# Patient Record
Sex: FEMALE | Race: WHITE | NOT HISPANIC OR LATINO | URBAN - METROPOLITAN AREA
[De-identification: names, ages, dates, MRNs, and addresses within clinical notes are randomized per-mention and may not be internally consistent; named-entity substitution may affect disease eponyms.]

---

## 2018-08-11 ENCOUNTER — OFFICE VISIT (OUTPATIENT)
Dept: URGENT CARE | Facility: MEDICAL CENTER | Age: 53
End: 2018-08-11
Payer: COMMERCIAL

## 2018-08-11 VITALS
BODY MASS INDEX: 42.27 KG/M2 | HEART RATE: 107 BPM | HEIGHT: 64 IN | TEMPERATURE: 97.9 F | DIASTOLIC BLOOD PRESSURE: 88 MMHG | WEIGHT: 247.6 LBS | SYSTOLIC BLOOD PRESSURE: 142 MMHG | RESPIRATION RATE: 20 BRPM | OXYGEN SATURATION: 100 %

## 2018-08-11 DIAGNOSIS — J20.9 ACUTE BRONCHITIS, UNSPECIFIED ORGANISM: Primary | ICD-10-CM

## 2018-08-11 DIAGNOSIS — R11.0 NAUSEA: ICD-10-CM

## 2018-08-11 PROCEDURE — 99213 OFFICE O/P EST LOW 20 MIN: CPT | Performed by: PHYSICIAN ASSISTANT

## 2018-08-11 RX ORDER — ONDANSETRON 4 MG/1
4 TABLET, ORALLY DISINTEGRATING ORAL ONCE
Status: COMPLETED | OUTPATIENT
Start: 2018-08-11 | End: 2018-08-11

## 2018-08-11 RX ORDER — AZITHROMYCIN 250 MG/1
TABLET, FILM COATED ORAL
Qty: 6 TABLET | Refills: 0 | Status: SHIPPED | OUTPATIENT
Start: 2018-08-11 | End: 2018-08-15

## 2018-08-11 RX ORDER — GLIMEPIRIDE 2 MG/1
2 TABLET ORAL
COMMUNITY
Start: 2018-08-08

## 2018-08-11 RX ORDER — ALLOPURINOL 100 MG/1
100 TABLET ORAL
COMMUNITY
Start: 2018-03-23

## 2018-08-11 RX ORDER — MULTIVIT-MIN/IRON/FOLIC ACID/K 18-600-40
CAPSULE ORAL
COMMUNITY

## 2018-08-11 RX ORDER — GLIMEPIRIDE 4 MG/1
4 TABLET ORAL
COMMUNITY
Start: 2018-08-08

## 2018-08-11 RX ORDER — LORATADINE 10 MG/1
10 TABLET ORAL
COMMUNITY
Start: 2017-09-21 | End: 2018-09-21

## 2018-08-11 RX ORDER — DEXTROMETHORPHAN HYDROBROMIDE AND PROMETHAZINE HYDROCHLORIDE 15; 6.25 MG/5ML; MG/5ML
5 SYRUP ORAL 4 TIMES DAILY PRN
Qty: 118 ML | Refills: 0 | Status: SHIPPED | OUTPATIENT
Start: 2018-08-11

## 2018-08-11 RX ORDER — DICYCLOMINE HYDROCHLORIDE 10 MG/1
10 CAPSULE ORAL 3 TIMES DAILY
COMMUNITY
Start: 2018-08-09

## 2018-08-11 RX ORDER — LISINOPRIL 5 MG/1
5 TABLET ORAL
COMMUNITY
Start: 2018-07-05

## 2018-08-11 RX ADMIN — ONDANSETRON 4 MG: 4 TABLET, ORALLY DISINTEGRATING ORAL at 11:14

## 2018-08-11 NOTE — PROGRESS NOTES
[facility]        NAME: Hoa Ga is a 46 y o  female  : 1965    MRN: [id number]  DATE: 2018  TIME: 11:38 AM    Assessment and Plan   Acute bronchitis, unspecified organism [J20 9]  1  Acute bronchitis, unspecified organism  azithromycin (ZITHROMAX) 250 mg tablet    promethazine-dextromethorphan (PHENERGAN-DM) 6 25-15 mg/5 mL oral syrup   2  Nausea  ondansetron (ZOFRAN-ODT) dispersible tablet 4 mg         Patient Instructions     Zofran given in office  Take azithromycin as directed  Take with food and eat yogurt or a probiotic to decrease GI upset  Use promethazine-DM as needed for cough  Follow up with PCP in 3-5 days  Proceed to  ER if symptoms worsen  Chief Complaint     Chief Complaint   Patient presents with    Cough         History of Present Illness       This is a 46year old female presenting for URI symptoms x 2 days  She reports cough productive of white sputum, sneezing, sinus congestion, sore throat, crampy abdominal pain with diarrhea (patient with PMH of IBS, she is unsure if this is elevated from baseline), and nausea  She denies any fevers  + shortness of breath  She has not tried anything for her symptoms  She states that she has not been diagnosed with COPD but that she gets frequent bronchitis  Upon chart review, previous diagnosis of chronic bronchitis is noted  She states that she has taken prednisone before but she is a diabetic and has high blood pressure and her blood sugars "went way up" with prednisone treatment  Review of Systems   Review of Systems   Constitutional: Positive for chills and fatigue  Negative for fever  HENT: Positive for congestion, ear pain, rhinorrhea and sore throat  Negative for ear discharge and facial swelling  Respiratory: Positive for cough and shortness of breath  Negative for wheezing  Gastrointestinal: Positive for abdominal pain (crampy), diarrhea and nausea  Negative for vomiting     Musculoskeletal: Positive for myalgias  Skin: Negative for rash  Neurological: Negative for dizziness, weakness and headaches  Current Medications       Current Outpatient Prescriptions:     allopurinol (ZYLOPRIM) 100 mg tablet, Take 100 mg by mouth, Disp: , Rfl:     dicyclomine (BENTYL) 10 mg capsule, Take 10 mg by mouth Three times a day, Disp: , Rfl:     glimepiride (AMARYL) 2 mg tablet, Take 2 mg by mouth, Disp: , Rfl:     glimepiride (AMARYL) 4 mg tablet, Take 4 mg by mouth, Disp: , Rfl:     glucose blood test strip, Use to test twice daily, Disp: , Rfl:     lisinopril (ZESTRIL) 5 mg tablet, Take 5 mg by mouth, Disp: , Rfl:     loratadine (CLARITIN) 10 mg tablet, Take 10 mg by mouth, Disp: , Rfl:     metFORMIN (GLUCOPHAGE) 1000 MG tablet, Take 1,000 mg by mouth, Disp: , Rfl:     azithromycin (ZITHROMAX) 250 mg tablet, Take 2 tablets today then 1 tablet daily x 4 days, Disp: 6 tablet, Rfl: 0    Cholecalciferol (VITAMIN D) 2000 units CAPS, Take by mouth, Disp: , Rfl:     promethazine-dextromethorphan (PHENERGAN-DM) 6 25-15 mg/5 mL oral syrup, Take 5 mL by mouth 4 (four) times a day as needed for cough, Disp: 118 mL, Rfl: 0  No current facility-administered medications for this visit  Current Allergies     Allergies as of 08/11/2018 - Reviewed 08/11/2018   Allergen Reaction Noted    Penicillins  08/11/2018            The following portions of the patient's history were reviewed and updated as appropriate: allergies, current medications, past family history, past medical history, past social history, past surgical history and problem list      No past medical history on file  No past surgical history on file  No family history on file  Medications have been verified          Objective   /88 (BP Location: Left arm, Patient Position: Sitting, Cuff Size: Standard)   Pulse (!) 107   Temp 97 9 °F (36 6 °C) (Oral)   Resp 20   Ht 5' 4" (1 626 m)   Wt 112 kg (247 lb 9 6 oz)   SpO2 100% Breastfeeding? No   BMI 42 50 kg/m²        Physical Exam     Physical Exam   Constitutional: She appears well-developed and well-nourished  No distress  HENT:   Head: Normocephalic and atraumatic  Right Ear: Tympanic membrane, external ear and ear canal normal  No drainage or tenderness  Left Ear: Tympanic membrane, external ear and ear canal normal  No drainage or tenderness  Nose: Nose normal    Mouth/Throat: Uvula is midline, oropharynx is clear and moist and mucous membranes are normal  No oropharyngeal exudate, posterior oropharyngeal edema or posterior oropharyngeal erythema  Eyes: Conjunctivae are normal  Pupils are equal, round, and reactive to light  Neck: Normal range of motion  Neck supple  Cardiovascular: Normal rate, regular rhythm and normal heart sounds  Pulmonary/Chest: Effort normal and breath sounds normal  No respiratory distress  She has no decreased breath sounds  She has no wheezes  She has no rhonchi  She has no rales  Patient coughing frequently and appears out of breath with coughing  SpO2 remains 100% throughout visit  Abdominal: Soft  Bowel sounds are normal  She exhibits no distension and no mass  There is tenderness (TTP throughout the abdomen  Negative mcburneys, negative murphys, negative CVA ttp b/l )  There is no rebound and no guarding  Lymphadenopathy:     She has no cervical adenopathy  Neurological: She is alert  Skin: Skin is warm and dry  She is not diaphoretic  Nursing note and vitals reviewed

## 2018-08-11 NOTE — PATIENT INSTRUCTIONS
Zofran given in office  Take azithromycin as directed  Take with food and eat yogurt or a probiotic to decrease GI upset  Use promethazine-DM as needed for cough  Follow up with your PCP in 3-5 days  Go to the ER for any distress  Acute Bronchitis   AMBULATORY CARE:   Acute bronchitis  is swelling and irritation in the air passages of your lungs  This irritation may cause you to cough or have other breathing problems  Acute bronchitis often starts because of another illness, such as a cold or the flu  The illness spreads from your nose and throat to your windpipe and airways  Bronchitis is often called a chest cold  Acute bronchitis lasts about 3 to 6 weeks and is usually not a serious illness  Your cough can last for several weeks  You may have any of the following symptoms:   · A cough with sputum that may be clear, yellow, or green    · Feeling more tired than usual, and body aches    · A fever and chills    · Wheezing when you breathe    · A tight chest or pain when you breathe or cough  Seek care immediately if:   · You cough up blood  · Your lips or fingernails turn blue  · You feel like you are not getting enough air when you breathe  Contact your healthcare provider if:   · You have a fever  · Your breathing problems do not go away or get worse  · Your cough does not get better within 4 weeks  · You have questions or concerns about your condition or care  Self-care:   · Get more rest   Rest helps your body to heal  Slowly start to do more each day  Rest when you feel it is needed  · Avoid irritants in the air  Avoid chemicals, fumes, and dust  Wear a face mask if you must work around dust or fumes  Stay inside on days when air pollution levels are high  If you have allergies, stay inside when pollen counts are high  Do not use aerosol products, such as spray-on deodorant, bug spray, and hair spray  · Do not smoke or be around others who smoke    Nicotine and other chemicals in cigarettes and cigars damages the cilia that move mucus out of your lungs  Ask your healthcare provider for information if you currently smoke and need help to quit  E-cigarettes or smokeless tobacco still contain nicotine  Talk to your healthcare provider before you use these products  · Drink liquids as directed  Liquids help keep your air passages moist and help you cough up mucus  You may need to drink more liquids when you have acute bronchitis  Ask how much liquid to drink each day and which liquids are best for you  · Use a humidifier or vaporizer  Use a cool mist humidifier or a vaporizer to increase air moisture in your home  This may make it easier for you to breathe and help decrease your cough  Prevent acute bronchitis by doing the following:   · Get the vaccinations you need  Ask your healthcare provider if you should get vaccinated against the flu or pneumonia  · Prevent the spread of germs  You can decrease your risk of acute bronchitis and other illnesses by doing the following:     [facility] your hands often with soap and water  Carry germ-killing hand lotion or gel with you  You can use the lotion or gel to clean your hands when soap and water are not available  ¨ Do not touch your eyes, nose, or mouth unless you have washed your hands first     ¨ Always cover your mouth when you cough to prevent the spread of germs  It is best to cough into a tissue or your shirt sleeve instead of into your hand  Ask those around you cover their mouths when they cough  ¨ Try to avoid people who have a cold or the flu  If you are sick, stay away from others as much as possible  Medicines: Your healthcare provider may  give you any of the following:  · Ibuprofen or acetaminophen  are medicines that help lower your fever  They are available without a doctor's order  Ask your healthcare provider which medicine is right for you  Ask how much to take and how often to take it  Follow directions   These medicines can cause stomach bleeding if not taken correctly  Ibuprofen can cause kidney damage  Do not take ibuprofen if you have kidney disease, an ulcer, or allergies to aspirin  Acetaminophen can cause liver damage  Do not take more than 4,000 milligrams in 24 hours  · Decongestants  help loosen mucus in your lungs and make it easier to cough up  This can help you breathe easier  · Cough suppressants  decrease your urge to cough  If your cough produces mucus, do not take a cough suppressant unless your healthcare provider tells you to  Your healthcare provider may suggest that you take a cough suppressant at night so you can rest     · Inhalers  may be given  Your healthcare provider may give you one or more inhalers to help you breathe easier and cough less  An inhaler gives your medicine to open your airways  Ask your healthcare provider to show you how to use your inhaler correctly  Follow up with your healthcare provider as directed:  Write down questions you have so you will remember to ask them during your follow-up visits  © 2017 [facility] Information is for End User's use only and may not be sold, redistributed or otherwise used for commercial purposes  All illustrations and images included in CareNotes® are the copyrighted property of A D A M [employer] Inc  or Austin Ramos  The above information is an  only  It is not intended as medical advice for individual conditions or treatments  Talk to your doctor, nurse or pharmacist before following any medical regimen to see if it is safe and effective for you

## 2025-01-31 ENCOUNTER — TELEPHONE (OUTPATIENT)
Dept: OTHER | Facility: OTHER | Age: 60
End: 2025-01-31

## 2025-01-31 ENCOUNTER — NURSING HOME VISIT (OUTPATIENT)
Dept: GERIATRICS | Facility: OTHER | Age: 60
End: 2025-01-31
Payer: COMMERCIAL

## 2025-01-31 VITALS
WEIGHT: 213 LBS | BODY MASS INDEX: 35.49 KG/M2 | HEART RATE: 71 BPM | SYSTOLIC BLOOD PRESSURE: 118 MMHG | DIASTOLIC BLOOD PRESSURE: 79 MMHG | OXYGEN SATURATION: 96 % | RESPIRATION RATE: 18 BRPM | TEMPERATURE: 97.6 F | HEIGHT: 65 IN

## 2025-01-31 DIAGNOSIS — E55.9 VITAMIN D DEFICIENCY: ICD-10-CM

## 2025-01-31 DIAGNOSIS — R52 PAIN: Primary | ICD-10-CM

## 2025-01-31 DIAGNOSIS — T81.31XD POSTOPERATIVE WOUND DEHISCENCE, SUBSEQUENT ENCOUNTER: Primary | ICD-10-CM

## 2025-01-31 DIAGNOSIS — M1A.0710 IDIOPATHIC CHRONIC GOUT OF RIGHT FOOT WITHOUT TOPHUS: ICD-10-CM

## 2025-01-31 DIAGNOSIS — E11.69 TYPE 2 DIABETES MELLITUS WITH OTHER SPECIFIED COMPLICATION, WITHOUT LONG-TERM CURRENT USE OF INSULIN (HCC): ICD-10-CM

## 2025-01-31 DIAGNOSIS — I10 ESSENTIAL HYPERTENSION: ICD-10-CM

## 2025-01-31 PROBLEM — M1A.0790 IDIOPATHIC CHRONIC GOUT OF FOOT WITHOUT TOPHUS: Status: ACTIVE | Noted: 2025-01-31

## 2025-01-31 PROBLEM — E11.9 DIABETES MELLITUS, TYPE 2 (HCC): Status: ACTIVE | Noted: 2025-01-31

## 2025-01-31 PROBLEM — T81.31XA WOUND DEHISCENCE, SURGICAL: Status: ACTIVE | Noted: 2025-01-31

## 2025-01-31 PROCEDURE — 99304 1ST NF CARE SF/LOW MDM 25: CPT | Performed by: INTERNAL MEDICINE

## 2025-01-31 RX ORDER — OXYCODONE HYDROCHLORIDE 5 MG/1
5 TABLET ORAL EVERY 6 HOURS PRN
Qty: 4 TABLET | Refills: 0 | Status: SHIPPED | OUTPATIENT
Start: 2025-01-31 | End: 2025-01-31 | Stop reason: SDUPTHER

## 2025-01-31 RX ORDER — OXYCODONE HYDROCHLORIDE 5 MG/1
5 TABLET ORAL EVERY 6 HOURS PRN
COMMUNITY
End: 2025-01-31 | Stop reason: SDUPTHER

## 2025-01-31 NOTE — ASSESSMENT & PLAN NOTE
Lab Results   Component Value Date    HGBA1C 8.8 (H) 12/07/2024   Patient needs tighter control of her diabetes currently hemoglobin A1c was 8.8 she was under better control previously.  Will continue with metformin 1000 mg orally twice  daily.

## 2025-01-31 NOTE — ASSESSMENT & PLAN NOTE
Patient with left inguinal wound dehiscence from previous vascular surgery.  Wound care will manage patient physical and Occupational Therapy will be involved also in getting patient ambulating and getting ready to return home.

## 2025-01-31 NOTE — TELEPHONE ENCOUNTER
"RN stated, \"A prescription for Oxycodone was sent to pharmacy but it was rejected because there was no quantity listed on the script. Pt is asking for med, we would like an escript with quantity listed sent to pharmacy.\"    On call notified via secure chat.  "

## 2025-01-31 NOTE — PROGRESS NOTES
[facility]  Rhett Villa MD FACP-AGSF  History and physical dictated at [facility]  Time spent on dictation 50 minutes 20 minutes reviewing chart 30 minutes evaluating patient and dictating note.    NAME: Sofi Jain  AGE: 59 y.o. SEX: female [phone]    DATE OF ENCOUNTER: 1/31/2025    Assessment and Plan     Problem List Items Addressed This Visit          Cardiovascular and Mediastinum    Essential hypertension    Patient will be taking lisinopril 5 mg orally daily.            Endocrine    Diabetes mellitus, type 2 (HCC)      Lab Results   Component Value Date    HGBA1C 8.8 (H) 12/07/2024   Patient needs tighter control of her diabetes currently hemoglobin A1c was 8.8 she was under better control previously.  Will continue with metformin 1000 mg orally twice  daily.              Musculoskeletal and Integument    Idiopathic chronic gout of foot without tophus    Maintained on allopurinol 100 mg orally daily.            Surgery/Wound/Pain    Wound dehiscence, surgical - Primary    Patient with left inguinal wound dehiscence from previous vascular surgery.  Wound care will manage patient physical and Occupational Therapy will be involved also in getting patient ambulating and getting ready to return home.            Other    Vitamin D deficiency    Patient currently receiving vitamin D 50,000 units orally weekly for 8 weeks            All medications and routine orders were reviewed and updated as needed.    Plan discussed with: Patient    Chief Complaint     No chief complaint on file.  Wound care    History of Present Illness     Patient is a 59-year-old female with past medical history is significant for peripheral arterial disease, COPD, diabetes mellitus type 2, hypertension and history of former tobacco abuse.  Patient presented for a postop office visit with vascular surgery.  Patient recently had a left femoral posterior tibial artery bypass graft PTFE and  December 18 with Dr. Evin Peralta, patient attempted harvest of left GSV but inability to use secondary to inadequate size.  Patient has endorsed some increased left groin discomfort with increased redness and drainage from her incision site over the last several days at home..  Patient received antibiotics IV and was discharged on doxycycline.  When she presented to the vascular office she had evidence of wound dehiscence with worsening drainage from the left groin incision.  Left bypass graft remained clinically patent patient was admitted and subsequently discharged to our facility for further rehabilitation, and wound care.  Patient wound was evaluated and will be followed by wound care.  His last hemoglobin A1c was 8.8 in December showing poor control.  Patient had been taking metformin 1000 mg orally twice daily.          HISTORY:  History reviewed. No pertinent surgical history.   History reviewed. No pertinent past medical history.  History reviewed. No pertinent family history.  Social History     Socioeconomic History    Marital status: Single     Spouse name: None    Number of children: None    Years of education: None    Highest education level: None   Occupational History    None   Tobacco Use    Smoking status: Every Day     Current packs/day: 1.50     Average packs/day: 1.5 packs/day for 39.0 years (58.5 ttl pk-yrs)     Types: Cigarettes    Smokeless tobacco: Never   Substance and Sexual Activity    Alcohol use: None    Drug use: None    Sexual activity: None   Other Topics Concern    None   Social History Narrative    None     Social Drivers of Health     Financial Resource Strain: Medium Risk (1/1/2025)    Received from [facility]    Overall Financial Resource Strain (CARDIA)     Difficulty of Paying Living Expenses: Somewhat hard   Food Insecurity: No Food Insecurity (1/1/2025)    Received from [facility]    Hunger Vital Sign     Worried About Running Out of Food  in the Last Year: Never true     Ran Out of Food in the Last Year: Never true   Transportation Needs: No Transportation Needs (1/1/2025)    Received from [facility]    PRAPARE - Transportation     Lack of Transportation (Medical): No     Lack of Transportation (Non-Medical): No   Physical Activity: Not on file   Stress: Not on file   Social Connections: Unknown (6/28/2023)    Received from [facility]    Social Connection and Isolation Panel [NHANES]     Frequency of Communication with Friends and Family: More than three times a week     Frequency of Social Gatherings with Friends and Family: More than three times a week     Attends Religion Services: Never     Active Member of Clubs or Organizations: No     Attends Club or Organization Meetings: Never     Marital Status: Not on file   Intimate Partner Violence: Not At Risk (1/1/2025)    Received from [facility]    Humiliation, Afraid, Rape, and Kick questionnaire     Fear of Current or Ex-Partner: No     Emotionally Abused: No     Physically Abused: No     Sexually Abused: No   Housing Stability: Low Risk  (1/1/2025)    Received from [facility]    Housing Stability Vital Sign     Unable to Pay for Housing in the Last Year: No     Number of Times Moved in the Last Year: 0     Homeless in the Last Year: No       Allergies:  Allergies   Allergen Reactions    Penicillins        Review of Systems     Review of Systems   Constitutional: Negative.    HENT: Negative.     Eyes:  Positive for visual disturbance.   Respiratory: Negative.     Cardiovascular: Negative.    Endocrine:        History of diabetes mellitus type 2   Genitourinary: Negative.    Musculoskeletal:  Positive for gait problem.   Skin:  Positive for wound.   Allergic/Immunologic: Negative.    Psychiatric/Behavioral: Negative.         PHQ-2/9 Depression Screening             Medications and orders       Current Outpatient Medications:     " allopurinol (ZYLOPRIM) 100 mg tablet, Take 100 mg by mouth, Disp: , Rfl:     Cholecalciferol (VITAMIN D) 2000 units CAPS, Take by mouth, Disp: , Rfl:     dicyclomine (BENTYL) 10 mg capsule, Take 10 mg by mouth Three times a day, Disp: , Rfl:     glimepiride (AMARYL) 2 mg tablet, Take 2 mg by mouth, Disp: , Rfl:     glimepiride (AMARYL) 4 mg tablet, Take 4 mg by mouth, Disp: , Rfl:     glucose blood test strip, Use to test twice daily, Disp: , Rfl:     lisinopril (ZESTRIL) 5 mg tablet, Take 5 mg by mouth, Disp: , Rfl:     loratadine (CLARITIN) 10 mg tablet, Take 10 mg by mouth, Disp: , Rfl:     metFORMIN (GLUCOPHAGE) 1000 MG tablet, Take 1,000 mg by mouth, Disp: , Rfl:     oxyCODONE (ROXICODONE) 5 immediate release tablet, Take 1 tablet (5 mg total) by mouth every 6 (six) hours as needed for severe pain Max Daily Amount: 20 mg, Disp: 40 tablet, Rfl: 0    promethazine-dextromethorphan (PHENERGAN-DM) 6.25-15 mg/5 mL oral syrup, Take 5 mL by mouth 4 (four) times a day as needed for cough, Disp: 118 mL, Rfl: 0       Objective     Vitals:   Vitals:    01/31/25 1753   BP: 118/79   Pulse: 71   Resp: 18   Temp: 97.6 °F (36.4 °C)   SpO2: 96%   Weight: 96.6 kg (213 lb)   Height: 5' 5\" (1.651 m)       Physical Exam  Constitutional:       Appearance: She is obese.   HENT:      Head: Atraumatic.      Right Ear: External ear normal.      Left Ear: External ear normal.      Nose: Nose normal.      Mouth/Throat:      Pharynx: Oropharynx is clear.   Eyes:      Conjunctiva/sclera: Conjunctivae normal.   Cardiovascular:      Rate and Rhythm: Normal rate and regular rhythm.      Pulses: Normal pulses.      Heart sounds: Normal heart sounds.   Pulmonary:      Effort: Pulmonary effort is normal.      Breath sounds: Normal breath sounds.   Abdominal:      General: Bowel sounds are normal.   Musculoskeletal:      Cervical back: Neck supple.   Skin:     General: Skin is dry.      Comments: Patient with open wound in left groin open at 2 " different sites.   Neurological:      Mental Status: She is alert and oriented to person, place, and time. Mental status is at baseline.   Psychiatric:         Mood and Affect: Mood normal.         Pertinent Laboratory/Diagnostic Studies:   The following labs/studies were reviewed please see facility chart for details.

## 2025-02-03 ENCOUNTER — NURSING HOME VISIT (OUTPATIENT)
Dept: GERIATRICS | Facility: OTHER | Age: 60
End: 2025-02-03
Payer: COMMERCIAL

## 2025-02-03 VITALS
BODY MASS INDEX: 35.45 KG/M2 | WEIGHT: 213 LBS | DIASTOLIC BLOOD PRESSURE: 79 MMHG | HEART RATE: 78 BPM | TEMPERATURE: 97.7 F | RESPIRATION RATE: 18 BRPM | SYSTOLIC BLOOD PRESSURE: 132 MMHG | OXYGEN SATURATION: 96 %

## 2025-02-03 DIAGNOSIS — E55.9 VITAMIN D DEFICIENCY: ICD-10-CM

## 2025-02-03 DIAGNOSIS — I10 ESSENTIAL HYPERTENSION: Primary | ICD-10-CM

## 2025-02-03 DIAGNOSIS — M1A.0710 IDIOPATHIC CHRONIC GOUT OF RIGHT FOOT WITHOUT TOPHUS: ICD-10-CM

## 2025-02-03 DIAGNOSIS — T81.31XD POSTOPERATIVE WOUND DEHISCENCE, SUBSEQUENT ENCOUNTER: ICD-10-CM

## 2025-02-03 DIAGNOSIS — E11.69 TYPE 2 DIABETES MELLITUS WITH OTHER SPECIFIED COMPLICATION, WITHOUT LONG-TERM CURRENT USE OF INSULIN (HCC): ICD-10-CM

## 2025-02-03 PROCEDURE — 99309 SBSQ NF CARE MODERATE MDM 30: CPT

## 2025-02-03 RX ORDER — INSULIN GLARGINE 100 [IU]/ML
35 INJECTION, SOLUTION SUBCUTANEOUS DAILY
COMMUNITY
End: 2025-02-14

## 2025-02-03 RX ORDER — ERGOCALCIFEROL 1.25 MG/1
50000 CAPSULE ORAL WEEKLY
COMMUNITY
End: 2025-04-04

## 2025-02-03 RX ORDER — FENOFIBRATE 67 MG/1
67 CAPSULE ORAL
COMMUNITY

## 2025-02-03 RX ORDER — MULTIVITAMIN
1 TABLET ORAL DAILY
COMMUNITY

## 2025-02-03 RX ORDER — FAMOTIDINE 20 MG/1
20 TABLET, FILM COATED ORAL 2 TIMES DAILY
COMMUNITY

## 2025-02-03 RX ORDER — INSULIN LISPRO 100 [IU]/ML
12 INJECTION, SOLUTION INTRAVENOUS; SUBCUTANEOUS
COMMUNITY
End: 2025-02-14

## 2025-02-03 RX ORDER — CLOPIDOGREL BISULFATE 75 MG/1
75 TABLET ORAL DAILY
COMMUNITY

## 2025-02-03 RX ORDER — HYDROXYZINE HYDROCHLORIDE 25 MG/1
25 TABLET, FILM COATED ORAL EVERY 8 HOURS PRN
COMMUNITY

## 2025-02-03 RX ORDER — MULTIVIT WITH MINERALS/LUTEIN
250 TABLET ORAL DAILY
COMMUNITY

## 2025-02-03 RX ORDER — ACETAMINOPHEN 500 MG
500 TABLET ORAL EVERY 6 HOURS PRN
COMMUNITY

## 2025-02-03 NOTE — ASSESSMENT & PLAN NOTE
S/P vascular sx with wound dehiscence  Left groin wound with drainage  Continue local wound care  Wound NP to follow

## 2025-02-03 NOTE — PROGRESS NOTES
[facility]  [address] [address]  [phone]  [facility]  Code 31 (STR)          NAME: Sofi Jain  AGE: 59 y.o. SEX: female CODE STATUS: CPR    DATE OF ENCOUNTER: 2/3/2025    Assessment and Plan     1. Essential hypertension  Assessment & Plan:  /79   Continue to monitor BP  Continue Lisinopril 5 mg daily  2. Type 2 diabetes mellitus with other specified complication, without long-term current use of insulin (HCC)  Assessment & Plan:    Lab Results   Component Value Date    HGBA1C 8.8 (H) 12/07/2024   A1c above goal    Continue accuchecks  Continue metformin 1000 mg BID  Encourage diabetic diet  3. Idiopathic chronic gout of right foot without tophus  Assessment & Plan:  Continue Allopurinol 100 mg daily  4. Postoperative wound dehiscence, subsequent encounter  Assessment & Plan:  S/P vascular sx with wound dehiscence  Left groin wound with drainage  Continue local wound care  Wound NP to follow  5. Vitamin D deficiency  Assessment & Plan:  Continue Vitamin D 50,000 through 4/4/25       All medications and routine orders were reviewed and updated as needed.    Chief Complaint     STR follow up visit  Patient's care was coordinated with nursing facility staff. Recent vitals, labs, and updated medications were review on [facility] system in facility.  Past Medical and Surgical History      History reviewed. No pertinent past medical history.  History reviewed. No pertinent surgical history.  Allergies   Allergen Reactions    Penicillins           History of Present Illness     HPI  Sofi Jain is a 59 year old female, she is a STR patient of [facility] since 1/30/25. Past Medical Hx including but not limited to HTN, DM, vitamin D deficiency, gout. She was seen in collaboration with nursing for medical mgmt and STR follow up.     Hospital Course  Patient was admitted to the hospital 12/31/24 d/t postoperative infection. Patient had recent  left femoral post. Tibial artery bypass graft ( PTFE) on 12/18/24. Attempted harvest of left GSV but unable 2/2 to inadequate size.  Patient reported increased left groin pain, redness, and drainage from her incision site over the last several days.  Patient had gone to the ER the day before and received IV antibiotics and was discharged on doxycycline which she had not started yet.  Patient had gone to vascular office today with wound dehiscence and worsening drainage and erythema from left groin incision.  Patient was sent to the ER. Patient underwent sx debridement with vac placement. Patient was recommended for rehab and was discharged to [facility].    Rehab Course  Sofi was seen and examined at bedside today.  Patient is alert and oriented x 3.  On exam patient is resting in bed does not appear to be in any distress.  She reports some pain in her left groin for which she has been taking Tylenol, oxycodone.  Left groin incision noted to have some drainage, dressing intact.  Continue local wound care.  She denies difficulty sleeping, and has a good appetite.  She states therapy has been going well.      Denies CP/SOB/N/V/D. Denies lightheadedness, dizziness, headaches, vision changes. Patient states they are eating well and staying hydrated. Denies any bowel or bladder issues. Per review of SNF records, Patient is eating 3 meals per day, consuming %. Last documented BM 2/1/2025. No concerns from nursing at this time.    The patient's allergies, past medical, surgical, social and family history were reviewed and unchanged.    Review of Systems     Review of Systems   Constitutional:  Positive for activity change. Negative for appetite change and fever.   HENT:  Negative for congestion.    Respiratory:  Negative for cough, shortness of breath and wheezing.    Cardiovascular:  Negative for chest pain, palpitations and leg swelling.   Gastrointestinal:  Negative for constipation, diarrhea, nausea  and vomiting.   Genitourinary:  Negative for difficulty urinating and dysuria.   Musculoskeletal:  Positive for gait problem.   Skin:  Positive for wound.        Left groin incision   Neurological:  Negative for dizziness, weakness and headaches.   Psychiatric/Behavioral:  Negative for sleep disturbance.          Objective     Vitals:   Vitals:    02/03/25 1258   BP: 132/79   Pulse: 78   Resp: 18   Temp: 97.7 °F (36.5 °C)   SpO2: 96%         Physical Exam  Constitutional:       Appearance: Normal appearance.   HENT:      Head: Normocephalic and atraumatic.      Mouth/Throat:      Mouth: Mucous membranes are moist.   Eyes:      Conjunctiva/sclera: Conjunctivae normal.   Cardiovascular:      Rate and Rhythm: Normal rate and regular rhythm.      Heart sounds: Normal heart sounds.   Pulmonary:      Effort: Pulmonary effort is normal. No respiratory distress.      Breath sounds: Normal breath sounds. No wheezing.   Abdominal:      General: Bowel sounds are normal. There is no distension.      Palpations: Abdomen is soft.      Tenderness: There is no abdominal tenderness.   Musculoskeletal:      Right lower leg: No edema.      Left lower leg: No edema.   Skin:     General: Skin is warm.   Neurological:      Mental Status: She is alert and oriented to person, place, and time.   Psychiatric:         Mood and Affect: Mood normal.         Behavior: Behavior normal.         Pertinent Laboratory/Diagnostic Studies:   Reviewed in facility chart-stable      Current Medications   Medications reviewed and updated see facility MAR for details.      Current Outpatient Medications:     acetaminophen (TYLENOL) 500 mg tablet, Take 500 mg by mouth every 6 (six) hours as needed for mild pain, Disp: , Rfl:     ascorbic acid (VITAMIN C) 250 mg tablet, Take 250 mg by mouth daily, Disp: , Rfl:     Cholecalciferol (VITAMIN D) 2000 units CAPS, Take by mouth, Disp: , Rfl:     clopidogrel (PLAVIX) 75 mg tablet, Take 75 mg by mouth daily, Disp: ,  "Rfl:     ergocalciferol (ERGOCALCIFEROL) 1.25 MG (23523 UT) capsule, Take 50,000 Units by mouth once a week, Disp: , Rfl:     famotidine (PEPCID) 20 mg tablet, Take 20 mg by mouth 2 (two) times a day, Disp: , Rfl:     fenofibrate micronized (LOFIBRA) 67 MG capsule, Take 67 mg by mouth every morning before breakfast, Disp: , Rfl:     insulin glargine (LANTUS) 100 units/mL subcutaneous injection, Inject 35 Units under the skin daily, Disp: , Rfl:     insulin lispro (HumALOG/ADMELOG) 100 units/mL injection, Inject 12 Units under the skin 3 (three) times a day with meals, Disp: , Rfl:     metFORMIN (GLUCOPHAGE) 1000 MG tablet, Take 1,000 mg by mouth, Disp: , Rfl:     Multiple Vitamin (multivitamin) tablet, Take 1 tablet by mouth daily, Disp: , Rfl:     allopurinol (ZYLOPRIM) 100 mg tablet, Take 100 mg by mouth, Disp: , Rfl:     lisinopril (ZESTRIL) 5 mg tablet, Take 5 mg by mouth, Disp: , Rfl:     loratadine (CLARITIN) 10 mg tablet, Take 10 mg by mouth, Disp: , Rfl:     oxyCODONE (ROXICODONE) 5 immediate release tablet, Take 1 tablet (5 mg total) by mouth every 6 (six) hours as needed for severe pain Max Daily Amount: 20 mg, Disp: 40 tablet, Rfl: 0     Please note:  Voice-recognition software may have been used in the preparation of this document.  Occasional wrong word or \"sound-alike\" substitutions may have occurred due to the inherent limitations of voice recognition software.  Interpretation should be guided by context.         MARTA Keller  2/3/2025  2:07 PM    "

## 2025-02-03 NOTE — ASSESSMENT & PLAN NOTE
Lab Results   Component Value Date    HGBA1C 8.8 (H) 12/07/2024   A1c above goal    Continue accuchecks  Continue metformin 1000 mg BID  Encourage diabetic diet

## 2025-02-05 ENCOUNTER — NURSING HOME VISIT (OUTPATIENT)
Dept: WOUND CARE | Facility: HOSPITAL | Age: 60
End: 2025-02-05
Payer: COMMERCIAL

## 2025-02-05 DIAGNOSIS — E11.69 TYPE 2 DIABETES MELLITUS WITH OTHER SPECIFIED COMPLICATION, WITHOUT LONG-TERM CURRENT USE OF INSULIN (HCC): ICD-10-CM

## 2025-02-05 DIAGNOSIS — R26.2 AMBULATORY DYSFUNCTION: ICD-10-CM

## 2025-02-05 DIAGNOSIS — T81.31XD POSTOPERATIVE WOUND DEHISCENCE, SUBSEQUENT ENCOUNTER: Primary | ICD-10-CM

## 2025-02-05 PROCEDURE — 99305 1ST NF CARE MODERATE MDM 35: CPT | Performed by: NURSE PRACTITIONER

## 2025-02-05 NOTE — PROGRESS NOTES
[facility] WOUND CARE MANAGEMENT   AND HYPERBARIC MEDICINE CENTER       Patient ID: Sofi Jain is a 59 y.o. female Date of Birth 1965     Location of Service: [facility]    Performed wound round with: Wound team     Chief Complaint : Left thigh    Wound Instructions:  Wound:  Left thigh  Discontinue previous wound order  Cleanse the wound bed with NSS   Apply non-sting skin prep to periwound area  Apply calcium alginate to wound bed, then cover with ABD pad   Frequency : daily and prn for soiling  Offload all wounds  Turn and reposition frequently  Instruct / Assist with weight shifting in wheelchair  Increase protein intake.  Monitor for any sign of infection or worsening, inform PCP or patient's primary physician in your facility.      Allergies  Penicillins      Assessment & Plan:  1. Postoperative wound dehiscence, subsequent encounter  Assessment & Plan:  Patient is status post left femoral bypass graft on December 18, 2024  The surgical incision on the left thigh have 3 open area, the biggest is about 5 cm, with 2 cm depth, with moderate amount of serous drainage  Local wound care with calcium alginate  Patient surgical incisions to left sutures.  As per patient, she does not have any follow-up appointment with surgeon.  Patient needs to see surgeon for suture removal.  I provided update to patient, let her know that the wound probably will benefit from wound VAC.  However the patient is concerned that she cannot go home if she is on wound VAC because there is no home health that will take care of the wound VAC.  Increase protein intake  Follow-up next week  2. Type 2 diabetes mellitus with other specified complication, without long-term current use of insulin (HCC)  Assessment & Plan:   A1C results reviewed with the patient today.   Lab Results   Component Value Date    HGBA1C 8.8 (H) 12/07/2024   Under the care of Senior care team  3. Ambulatory dysfunction  Assessment &  Plan:  On short-term rehab           Subjective:   February 5, 2025.  New hospital wound on the left thigh.  Patient was referred by Senior care team.  Medical problem includes but not limited to type 2 diabetes, hypertension, and vitamin D deficiency.  I introduced myself to patient and patient agreed to be seen for wound consult.  Patient was seen with the facility wound team.    Wound history: As per medical record review, patient had femoral bypass graft for 18, 2024 at [facility].  The wound dehisced.  At one point, the wound was on wound VAC.    Received patient in bed, seems comfortable.  Denies pain.  As per patient, she wants to go home.  She does not want wound VAC.  There is no home health that will be taking care of wound VAC.        Review of Systems   Constitutional: Negative.    Respiratory: Negative.     Cardiovascular: Negative.    Musculoskeletal:  Positive for gait problem.   Skin:  Positive for wound.       Objective:    Physical Exam  Constitutional:       Appearance: Normal appearance.   Cardiovascular:      Rate and Rhythm: Normal rate.   Pulmonary:      Effort: Pulmonary effort is normal.   Musculoskeletal:      Right lower leg: Edema present.      Comments: Mild edema left lower leg   Skin:     Findings: Lesion present.      Comments: Left thigh: Surgical wound is sutured, 10 sutures, with 3 dehisced area  Proximal: Wound size is 5.89 x 1.83 x 1 cm.,  100% granulation, moderate amount of serous drainage, periwound normal, with no obvious sign of infection  Medial: 10% slough, 90% granulation, moderate amount of serous drainage, 2 cm depth  Distal: 100% granulation, 2 cm depth, moderate amount of serous drainage, periwound normal, with no obvious sign of infection   Neurological:      Mental Status: She is alert.              Procedures           Patient's care was coordinated with nursing facility staff. Recent vitals, labs and updated medications were reviewed on EMR or chart  system of facility. Past Medical, surgical, social, medication and allergy history and patient's previous records were reviewed and updated as appropriate: Most up-to date information is available in the facility EMR where the patient is currently admitted.    Patient Active Problem List   Diagnosis    Wound dehiscence, surgical    Diabetes mellitus, type 2 (HCC)    Essential hypertension    Idiopathic chronic gout of foot without tophus    Vitamin D deficiency    Ambulatory dysfunction     History reviewed. No pertinent past medical history.  History reviewed. No pertinent surgical history.  Social History     Socioeconomic History    Marital status: Single     Spouse name: None    Number of children: None    Years of education: None    Highest education level: None   Occupational History    None   Tobacco Use    Smoking status: Every Day     Current packs/day: 1.50     Average packs/day: 1.5 packs/day for 39.0 years (58.5 ttl pk-yrs)     Types: Cigarettes    Smokeless tobacco: Never   Substance and Sexual Activity    Alcohol use: None    Drug use: None    Sexual activity: None   Other Topics Concern    None   Social History Narrative    None     Social Drivers of Health     Financial Resource Strain: Medium Risk (1/1/2025)    Received from [facility]    Overall Financial Resource Strain (CARDIA)     Difficulty of Paying Living Expenses: Somewhat hard   Food Insecurity: No Food Insecurity (1/1/2025)    Received from [facility]    Hunger Vital Sign     Worried About Running Out of Food in the Last Year: Never true     Ran Out of Food in the Last Year: Never true   Transportation Needs: No Transportation Needs (1/1/2025)    Received from [facility]    PRAPARE - Transportation     Lack of Transportation (Medical): No     Lack of Transportation (Non-Medical): No   Physical Activity: Not on file   Stress: Not on file   Social Connections: Unknown (6/28/2023)     Received from [facility]    Social Connection and Isolation Panel [NHANES]     Frequency of Communication with Friends and Family: More than three times a week     Frequency of Social Gatherings with Friends and Family: More than three times a week     Attends Quaker Services: Never     Active Member of Clubs or Organizations: No     Attends Club or Organization Meetings: Never     Marital Status: Not on file   Intimate Partner Violence: Not At Risk (1/1/2025)    Received from [facility]    Humiliation, Afraid, Rape, and Kick questionnaire     Fear of Current or Ex-Partner: No     Emotionally Abused: No     Physically Abused: No     Sexually Abused: No   Housing Stability: Low Risk  (1/1/2025)    Received from [facility]    Housing Stability Vital Sign     Unable to Pay for Housing in the Last Year: No     Number of Times Moved in the Last Year: 0     Homeless in the Last Year: No        Current Outpatient Medications:     acetaminophen (TYLENOL) 500 mg tablet, Take 500 mg by mouth every 6 (six) hours as needed for mild pain, Disp: , Rfl:     allopurinol (ZYLOPRIM) 100 mg tablet, Take 100 mg by mouth, Disp: , Rfl:     ascorbic acid (VITAMIN C) 250 mg tablet, Take 250 mg by mouth daily, Disp: , Rfl:     Cholecalciferol (VITAMIN D) 2000 units CAPS, Take by mouth, Disp: , Rfl:     clopidogrel (PLAVIX) 75 mg tablet, Take 75 mg by mouth daily, Disp: , Rfl:     ergocalciferol (ERGOCALCIFEROL) 1.25 MG (90777 UT) capsule, Take 50,000 Units by mouth once a week, Disp: , Rfl:     famotidine (PEPCID) 20 mg tablet, Take 20 mg by mouth 2 (two) times a day, Disp: , Rfl:     fenofibrate micronized (LOFIBRA) 67 MG capsule, Take 67 mg by mouth every morning before breakfast, Disp: , Rfl:     hydrOXYzine HCL (ATARAX) 25 mg tablet, Take 25 mg by mouth every 8 (eight) hours as needed for itching, Disp: , Rfl:     insulin glargine (LANTUS) 100 units/mL subcutaneous injection,  "Inject 35 Units under the skin daily, Disp: , Rfl:     insulin lispro (HumALOG/ADMELOG) 100 units/mL injection, Inject 12 Units under the skin 3 (three) times a day with meals, Disp: , Rfl:     lisinopril (ZESTRIL) 5 mg tablet, Take 5 mg by mouth, Disp: , Rfl:     loratadine (CLARITIN) 10 mg tablet, Take 10 mg by mouth, Disp: , Rfl:     metFORMIN (GLUCOPHAGE) 1000 MG tablet, Take 1,000 mg by mouth, Disp: , Rfl:     Multiple Vitamin (multivitamin) tablet, Take 1 tablet by mouth daily, Disp: , Rfl:     oxyCODONE (ROXICODONE) 5 immediate release tablet, Take 1 tablet (5 mg total) by mouth every 6 (six) hours as needed for severe pain Max Daily Amount: 20 mg, Disp: 40 tablet, Rfl: 0  History reviewed. No pertinent family history.           Coordination of Care: Wound team aware of the treatment plan. Facility nurse will provide wound treatment and monitor the wound for any changes.     Patient / Staff education : Patient / Staff was given education on sign of infection and pressure ulcer prevention. Patient/ Staff verbalized understanding     Follow up :  Next week    Voice-recognition software may have been used in the preparation of this document. Occasional wrong word or \"sound-alike\" substitutions may have occurred due to the inherent limitations of voice recognition software. Interpretation should be guided by context.      MARTA Jara  "

## 2025-02-05 NOTE — ASSESSMENT & PLAN NOTE
Patient is status post left femoral bypass graft on December 18, 2024  The surgical incision on the left thigh have 3 open area, the biggest is about 5 cm, with 2 cm depth, with moderate amount of serous drainage  Local wound care with calcium alginate  Patient surgical incisions to left sutures.  As per patient, she does not have any follow-up appointment with surgeon.  Patient needs to see surgeon for suture removal.  I provided update to patient, let her know that the wound probably will benefit from wound VAC.  However the patient is concerned that she cannot go home if she is on wound VAC because there is no home health that will take care of the wound VAC.  Increase protein intake  Follow-up next week

## 2025-02-05 NOTE — ASSESSMENT & PLAN NOTE
A1C results reviewed with the patient today.   Lab Results   Component Value Date    HGBA1C 8.8 (H) 12/07/2024   Under the care of Senior care team

## 2025-02-06 ENCOUNTER — NURSING HOME VISIT (OUTPATIENT)
Dept: GERIATRICS | Facility: OTHER | Age: 60
End: 2025-02-06
Payer: COMMERCIAL

## 2025-02-06 ENCOUNTER — TELEPHONE (OUTPATIENT)
Dept: OTHER | Facility: OTHER | Age: 60
End: 2025-02-06

## 2025-02-06 VITALS
WEIGHT: 213 LBS | TEMPERATURE: 97.3 F | OXYGEN SATURATION: 97 % | HEART RATE: 78 BPM | DIASTOLIC BLOOD PRESSURE: 72 MMHG | SYSTOLIC BLOOD PRESSURE: 121 MMHG | BODY MASS INDEX: 35.45 KG/M2 | RESPIRATION RATE: 18 BRPM

## 2025-02-06 DIAGNOSIS — R09.81 MILD NASAL CONGESTION: Primary | ICD-10-CM

## 2025-02-06 DIAGNOSIS — E55.9 VITAMIN D DEFICIENCY: ICD-10-CM

## 2025-02-06 DIAGNOSIS — E11.69 TYPE 2 DIABETES MELLITUS WITH OTHER SPECIFIED COMPLICATION, WITHOUT LONG-TERM CURRENT USE OF INSULIN (HCC): ICD-10-CM

## 2025-02-06 DIAGNOSIS — R26.2 AMBULATORY DYSFUNCTION: ICD-10-CM

## 2025-02-06 DIAGNOSIS — I10 ESSENTIAL HYPERTENSION: ICD-10-CM

## 2025-02-06 DIAGNOSIS — T81.31XD POSTOPERATIVE WOUND DEHISCENCE, SUBSEQUENT ENCOUNTER: ICD-10-CM

## 2025-02-06 DIAGNOSIS — M1A.0710 IDIOPATHIC CHRONIC GOUT OF RIGHT FOOT WITHOUT TOPHUS: ICD-10-CM

## 2025-02-06 PROCEDURE — 99309 SBSQ NF CARE MODERATE MDM 30: CPT

## 2025-02-06 RX ORDER — GUAIFENESIN 200 MG/10ML
10 LIQUID ORAL 3 TIMES DAILY PRN
Start: 2025-02-06

## 2025-02-06 RX ORDER — FLUTICASONE PROPIONATE 50 MCG
1 SPRAY, SUSPENSION (ML) NASAL DAILY
Start: 2025-02-06

## 2025-02-06 NOTE — ASSESSMENT & PLAN NOTE
"Patient c/o \"cold\" symptoms  Nasal congestion, sore throat, non productive cough  Remains afebrile  COVID -  Orders placed for pee-tussin, Flonase, and Cepacol  Encourage po fluids  Continue to monitor  "

## 2025-02-06 NOTE — TELEPHONE ENCOUNTER
Zack from [facility] called to report patient is requesting an albuterol inhaler. Pt has a hx of COPD and she is having some congestion.     On call provider paged.

## 2025-02-06 NOTE — PROGRESS NOTES
"[facility]  [address] [address] [phone]  [facility] [place] [facility]  Code 31 (STR)          NAME: Sofi Jain  AGE: 59 y.o. SEX: female CODE STATUS: CPR    DATE OF ENCOUNTER: 2/6/2025    Assessment and Plan     1. Mild nasal congestion  Assessment & Plan:  Patient c/o \"cold\" symptoms  Nasal congestion, sore throat, non productive cough  Remains afebrile  COVID -  Orders placed for pee-tussin, Flonase, and Cepacol  Encourage po fluids  Continue to monitor  Orders:  -     fluticasone (FLONASE) 50 mcg/act nasal spray; 1 spray into each nostril daily  -     menthol-cetylpyridinium (CEPACOL) 3 MG lozenge; Take 1 lozenge (3 mg total) by mouth as needed for sore throat for up to 2 days  -     guaiFENesin (ROBITUSSIN) 100 MG/5ML oral liquid; Take 10 mL (200 mg total) by mouth 3 (three) times a day as needed for cough  2. Essential hypertension  Assessment & Plan:  /72  Continue to monitor BP  Continue Lisinopril 5 mg daily  3. Type 2 diabetes mellitus with other specified complication, without long-term current use of insulin (HCC)  Assessment & Plan:    Lab Results   Component Value Date    HGBA1C 8.8 (H) 12/07/2024   A1c above goal    Continue accuchecks  Continue metformin 1000 mg BID  Encourage diabetic diet  4. Idiopathic chronic gout of right foot without tophus  Assessment & Plan:  Continue Allopurinol 100 mg daily  5. Ambulatory dysfunction  Assessment & Plan:  Multifactorial  Continue PT/OT  Fall Precautions  Ensure adequate nutrition/hydration   Monitor CBC/BMP    following for d/c planning    6. Postoperative wound dehiscence, subsequent encounter  Assessment & Plan:  S/P vascular sx with wound dehiscence  Left groin wound with drainage  Continue local wound care  Wound NP to follow  7. Vitamin D deficiency  Assessment & Plan:  Continue Vitamin D 50,000 through 4/4/25       All medications and routine orders were reviewed and updated as needed.    Chief " Complaint     STR follow up visit  Patient's care was coordinated with nursing facility staff. Recent vitals, labs, and updated medications were review on [facility] system in facility.  Past Medical and Surgical History      History reviewed. No pertinent past medical history.  History reviewed. No pertinent surgical history.  Allergies   Allergen Reactions    Penicillins           History of Present Illness     HPI  Sofi Jain is a 59 year old female, she is a STR patient of [facility] since 1/30/25. Past Medical Hx including but not limited to HTN, DM, vitamin D deficiency, gout. She was seen in collaboration with nursing for medical mgmt and STR follow up.     Hospital Course  Patient was admitted to the hospital 12/31/24 d/t postoperative infection. Patient had recent left femoral post. Tibial artery bypass graft ( PTFE) on 12/18/24. Attempted harvest of left GSV but unable 2/2 to inadequate size.  Patient reported increased left groin pain, redness, and drainage from her incision site over the last several days.  Patient had gone to the ER the day before and received IV antibiotics and was discharged on doxycycline which she had not started yet.  Patient had gone to vascular office today with wound dehiscence and worsening drainage and erythema from left groin incision.  Patient was sent to the ER. Patient underwent sx debridement with vac placement. Patient was recommended for rehab and was discharged to [facility].    Rehab Course  Sofi was seen and examined at bedside today.  Patient is alert and oriented x 3.  On exam patient is resting in bed does not appear to be in any distress.  Patient complaining of upper respiratory symptoms today including nonproductive dry cough, sore throat, nasal congestion.  Patient was swabbed for COVID and was negative.  Orders placed for pee-tussin, Cepacol, Flonase.  Will continue to monitor.  Patient continues to take Tylenol and  oxycodone for pain management.  She continues to have drainage from left groin wound, dressing due to be changed, spoke with nursing.  Patient continues with therapy.    Denies CP/SOB/N/V/D. Denies lightheadedness, dizziness, headaches, vision changes. Denies any bowel or bladder issues. Per review of SNF records, Patient is eating 3 meals per day, consuming %. Last documented BM 2/5/2025. No concerns from nursing at this time.    The patient's allergies, past medical, surgical, social and family history were reviewed and unchanged.    Review of Systems     Review of Systems   Constitutional:  Positive for activity change. Negative for appetite change and fever.   HENT:  Positive for congestion, postnasal drip and sore throat.    Respiratory:  Positive for cough. Negative for shortness of breath and wheezing.    Cardiovascular:  Negative for chest pain, palpitations and leg swelling.   Gastrointestinal:  Negative for constipation, diarrhea, nausea and vomiting.   Genitourinary:  Negative for difficulty urinating and dysuria.   Musculoskeletal:  Positive for gait problem.   Skin:  Positive for wound.        Left groin incision   Neurological:  Negative for dizziness, weakness and headaches.   Psychiatric/Behavioral:  Negative for sleep disturbance.          Objective     Vitals:   Vitals:    02/06/25 1533   BP: 121/72   Pulse: 78   Resp: 18   Temp: (!) 97.3 °F (36.3 °C)   SpO2: 97%         Physical Exam  Constitutional:       Appearance: Normal appearance.   HENT:      Head: Normocephalic and atraumatic.      Nose: Congestion and rhinorrhea present.      Mouth/Throat:      Mouth: Mucous membranes are moist.   Eyes:      Conjunctiva/sclera: Conjunctivae normal.   Cardiovascular:      Rate and Rhythm: Normal rate and regular rhythm.      Heart sounds: Normal heart sounds.   Pulmonary:      Effort: Pulmonary effort is normal. No respiratory distress.      Breath sounds: Normal breath sounds. No wheezing.    Abdominal:      General: Bowel sounds are normal. There is no distension.      Palpations: Abdomen is soft.      Tenderness: There is no abdominal tenderness.   Musculoskeletal:      Right lower leg: No edema.      Left lower leg: No edema.   Skin:     General: Skin is warm.   Neurological:      Mental Status: She is alert and oriented to person, place, and time.   Psychiatric:         Mood and Affect: Mood normal.         Behavior: Behavior normal.         Pertinent Laboratory/Diagnostic Studies:   Reviewed in facility chart-stable      Current Medications   Medications reviewed and updated see facility MAR for details.      Current Outpatient Medications:     fluticasone (FLONASE) 50 mcg/act nasal spray, 1 spray into each nostril daily, Disp: , Rfl:     guaiFENesin (ROBITUSSIN) 100 MG/5ML oral liquid, Take 10 mL (200 mg total) by mouth 3 (three) times a day as needed for cough, Disp: , Rfl:     menthol-cetylpyridinium (CEPACOL) 3 MG lozenge, Take 1 lozenge (3 mg total) by mouth as needed for sore throat for up to 2 days, Disp: , Rfl:     acetaminophen (TYLENOL) 500 mg tablet, Take 500 mg by mouth every 6 (six) hours as needed for mild pain, Disp: , Rfl:     allopurinol (ZYLOPRIM) 100 mg tablet, Take 100 mg by mouth, Disp: , Rfl:     ascorbic acid (VITAMIN C) 250 mg tablet, Take 250 mg by mouth daily, Disp: , Rfl:     Cholecalciferol (VITAMIN D) 2000 units CAPS, Take by mouth, Disp: , Rfl:     clopidogrel (PLAVIX) 75 mg tablet, Take 75 mg by mouth daily, Disp: , Rfl:     ergocalciferol (ERGOCALCIFEROL) 1.25 MG (78826 UT) capsule, Take 50,000 Units by mouth once a week, Disp: , Rfl:     famotidine (PEPCID) 20 mg tablet, Take 20 mg by mouth 2 (two) times a day, Disp: , Rfl:     fenofibrate micronized (LOFIBRA) 67 MG capsule, Take 67 mg by mouth every morning before breakfast, Disp: , Rfl:     hydrOXYzine HCL (ATARAX) 25 mg tablet, Take 25 mg by mouth every 8 (eight) hours as needed for itching, Disp: , Rfl:      "insulin glargine (LANTUS) 100 units/mL subcutaneous injection, Inject 35 Units under the skin daily, Disp: , Rfl:     insulin lispro (HumALOG/ADMELOG) 100 units/mL injection, Inject 12 Units under the skin 3 (three) times a day with meals, Disp: , Rfl:     lisinopril (ZESTRIL) 5 mg tablet, Take 5 mg by mouth, Disp: , Rfl:     loratadine (CLARITIN) 10 mg tablet, Take 10 mg by mouth, Disp: , Rfl:     metFORMIN (GLUCOPHAGE) 1000 MG tablet, Take 1,000 mg by mouth, Disp: , Rfl:     Multiple Vitamin (multivitamin) tablet, Take 1 tablet by mouth daily, Disp: , Rfl:     oxyCODONE (ROXICODONE) 5 immediate release tablet, Take 1 tablet (5 mg total) by mouth every 6 (six) hours as needed for severe pain Max Daily Amount: 20 mg, Disp: 40 tablet, Rfl: 0     Please note:  Voice-recognition software may have been used in the preparation of this document.  Occasional wrong word or \"sound-alike\" substitutions may have occurred due to the inherent limitations of voice recognition software.  Interpretation should be guided by context.         MARTA Keller  2/6/2025  5:24 PM    "

## 2025-02-11 ENCOUNTER — NURSING HOME VISIT (OUTPATIENT)
Dept: GERIATRICS | Facility: OTHER | Age: 60
End: 2025-02-11
Payer: COMMERCIAL

## 2025-02-11 VITALS
BODY MASS INDEX: 36.91 KG/M2 | OXYGEN SATURATION: 95 % | DIASTOLIC BLOOD PRESSURE: 77 MMHG | RESPIRATION RATE: 18 BRPM | HEART RATE: 71 BPM | SYSTOLIC BLOOD PRESSURE: 141 MMHG | TEMPERATURE: 97.9 F | WEIGHT: 221.8 LBS

## 2025-02-11 DIAGNOSIS — I10 ESSENTIAL HYPERTENSION: ICD-10-CM

## 2025-02-11 DIAGNOSIS — M1A.0710 IDIOPATHIC CHRONIC GOUT OF RIGHT FOOT WITHOUT TOPHUS: ICD-10-CM

## 2025-02-11 DIAGNOSIS — T81.31XD POSTOPERATIVE WOUND DEHISCENCE, SUBSEQUENT ENCOUNTER: Primary | ICD-10-CM

## 2025-02-11 DIAGNOSIS — E55.9 VITAMIN D DEFICIENCY: ICD-10-CM

## 2025-02-11 DIAGNOSIS — E11.69 TYPE 2 DIABETES MELLITUS WITH OTHER SPECIFIED COMPLICATION, WITHOUT LONG-TERM CURRENT USE OF INSULIN (HCC): ICD-10-CM

## 2025-02-11 DIAGNOSIS — R26.2 AMBULATORY DYSFUNCTION: ICD-10-CM

## 2025-02-11 PROCEDURE — 99309 SBSQ NF CARE MODERATE MDM 30: CPT

## 2025-02-11 NOTE — ASSESSMENT & PLAN NOTE
S/P vascular sx with wound dehiscence  Left groin wound dressing intact  Patient requesting BID changes d/t drainage  Continue local wound care  Wound NP to follow

## 2025-02-11 NOTE — PROGRESS NOTES
[facility]  [address] [address]  [phone]  [place] [facility]  Code 31 (STR)          NAME: Sofi Jain  AGE: 59 y.o. SEX: female CODE STATUS: CPR    DATE OF ENCOUNTER: 2/11/2025    Assessment and Plan     1. Postoperative wound dehiscence, subsequent encounter  Assessment & Plan:  S/P vascular sx with wound dehiscence  Left groin wound dressing intact  Patient requesting BID changes d/t drainage  Continue local wound care  Wound NP to follow  2. Ambulatory dysfunction  Assessment & Plan:  Multifactorial  Continue PT/OT  Fall Precautions  Ensure adequate nutrition/hydration   Monitor CBC/BMP    following for d/c planning    3. Idiopathic chronic gout of right foot without tophus  Assessment & Plan:  Continue Allopurinol 100 mg daily  4. Type 2 diabetes mellitus with other specified complication, without long-term current use of insulin (HCC)  Assessment & Plan:    Lab Results   Component Value Date    HGBA1C 8.8 (H) 12/07/2024   A1c above goal    Continue accuchecks  Continue metformin 1000 mg BID  Encourage diabetic diet  5. Essential hypertension  Assessment & Plan:  /77  Continue to monitor BP  Continue Lisinopril 5 mg daily  6. Vitamin D deficiency  Assessment & Plan:  Continue Vitamin D 50,000 through 4/4/25       All medications and routine orders were reviewed and updated as needed.    Chief Complaint     STR follow up visit  Patient's care was coordinated with nursing facility staff. Recent vitals, labs, and updated medications were review on [facility] system in facility.  Past Medical and Surgical History      History reviewed. No pertinent past medical history.  History reviewed. No pertinent surgical history.  Allergies   Allergen Reactions    Penicillins           History of Present Illness     HPI  Sofi Jain is a 59 year old female, she is a STR patient of [facility] since 1/30/25. Past Medical Hx including but  "not limited to HTN, DM, vitamin D deficiency, gout. She was seen in collaboration with nursing for medical mgmt and STR follow up.     Hospital Course  Patient was admitted to the hospital 12/31/24 d/t postoperative infection. Patient had recent left femoral post. Tibial artery bypass graft ( PTFE) on 12/18/24. Attempted harvest of left GSV but unable 2/2 to inadequate size.  Patient reported increased left groin pain, redness, and drainage from her incision site over the last several days.  Patient had gone to the ER the day before and received IV antibiotics and was discharged on doxycycline which she had not started yet.  Patient had gone to vascular office today with wound dehiscence and worsening drainage and erythema from left groin incision.  Patient was sent to the ER. Patient underwent sx debridement with vac placement. Patient was recommended for rehab and was discharged to [facility].    Rehab Course  Sofi was seen and examined at bedside today.  Patient is alert and oriented x 3.  On exam patient is sitting in her wheelchair, does not appear to be in distress.  Patient reports being \"upset with the care at the facility\", administration aware.  She reports that groin wound is still draining and is requesting dressing to be changed twice daily. Wound NP following. She is eating and sleeping well. She continues with therapy.   Denies CP/SOB/N/V/D. Denies lightheadedness, dizziness, headaches, vision changes. Denies any bowel or bladder issues. Per review of SNF records, Patient is eating 3 meals per day, consuming %. Last documented BM 2/10/2025. No concerns from nursing at this time.    The patient's allergies, past medical, surgical, social and family history were reviewed and unchanged.    Review of Systems     Review of Systems   Constitutional:  Positive for activity change. Negative for appetite change and fever.   HENT:  Negative for congestion, postnasal drip and sore throat.  "   Respiratory:  Negative for shortness of breath and wheezing.    Cardiovascular:  Negative for chest pain, palpitations and leg swelling.   Gastrointestinal:  Negative for constipation, diarrhea, nausea and vomiting.   Genitourinary:  Negative for difficulty urinating and dysuria.   Musculoskeletal:  Positive for gait problem.   Skin:  Positive for wound.        Left groin incision   Neurological:  Negative for dizziness, weakness and headaches.   Psychiatric/Behavioral:  Negative for sleep disturbance.          Objective     Vitals:   Vitals:    02/11/25 0956   BP: 141/77   Pulse: 71   Resp: 18   Temp: 97.9 °F (36.6 °C)   SpO2: 95%         Physical Exam  Constitutional:       Appearance: Normal appearance.   HENT:      Head: Normocephalic and atraumatic.      Mouth/Throat:      Mouth: Mucous membranes are moist.   Eyes:      Conjunctiva/sclera: Conjunctivae normal.   Cardiovascular:      Rate and Rhythm: Normal rate and regular rhythm.      Heart sounds: Normal heart sounds.   Pulmonary:      Effort: Pulmonary effort is normal. No respiratory distress.      Breath sounds: Normal breath sounds. No wheezing.   Abdominal:      General: Bowel sounds are normal. There is no distension.      Palpations: Abdomen is soft.      Tenderness: There is no abdominal tenderness.   Musculoskeletal:      Right lower leg: No edema.      Left lower leg: No edema.   Skin:     General: Skin is warm.   Neurological:      Mental Status: She is alert and oriented to person, place, and time.      Motor: Weakness present.      Gait: Gait abnormal.   Psychiatric:         Mood and Affect: Mood normal.         Behavior: Behavior normal.         Pertinent Laboratory/Diagnostic Studies:   Reviewed in facility chart-stable      Current Medications   Medications reviewed and updated see facility MAR for details.      Current Outpatient Medications:     acetaminophen (TYLENOL) 500 mg tablet, Take 500 mg by mouth every 6 (six) hours as needed for  "mild pain, Disp: , Rfl:     allopurinol (ZYLOPRIM) 100 mg tablet, Take 100 mg by mouth, Disp: , Rfl:     ascorbic acid (VITAMIN C) 250 mg tablet, Take 250 mg by mouth daily, Disp: , Rfl:     Cholecalciferol (VITAMIN D) 2000 units CAPS, Take by mouth, Disp: , Rfl:     clopidogrel (PLAVIX) 75 mg tablet, Take 75 mg by mouth daily, Disp: , Rfl:     ergocalciferol (ERGOCALCIFEROL) 1.25 MG (59393 UT) capsule, Take 50,000 Units by mouth once a week, Disp: , Rfl:     famotidine (PEPCID) 20 mg tablet, Take 20 mg by mouth 2 (two) times a day, Disp: , Rfl:     fenofibrate micronized (LOFIBRA) 67 MG capsule, Take 67 mg by mouth every morning before breakfast, Disp: , Rfl:     fluticasone (FLONASE) 50 mcg/act nasal spray, 1 spray into each nostril daily, Disp: , Rfl:     guaiFENesin (ROBITUSSIN) 100 MG/5ML oral liquid, Take 10 mL (200 mg total) by mouth 3 (three) times a day as needed for cough, Disp: , Rfl:     hydrOXYzine HCL (ATARAX) 25 mg tablet, Take 25 mg by mouth every 8 (eight) hours as needed for itching, Disp: , Rfl:     insulin glargine (LANTUS) 100 units/mL subcutaneous injection, Inject 35 Units under the skin daily, Disp: , Rfl:     insulin lispro (HumALOG/ADMELOG) 100 units/mL injection, Inject 12 Units under the skin 3 (three) times a day with meals, Disp: , Rfl:     lisinopril (ZESTRIL) 5 mg tablet, Take 5 mg by mouth, Disp: , Rfl:     loratadine (CLARITIN) 10 mg tablet, Take 10 mg by mouth, Disp: , Rfl:     metFORMIN (GLUCOPHAGE) 1000 MG tablet, Take 1,000 mg by mouth, Disp: , Rfl:     Multiple Vitamin (multivitamin) tablet, Take 1 tablet by mouth daily, Disp: , Rfl:     oxyCODONE (ROXICODONE) 5 immediate release tablet, Take 1 tablet (5 mg total) by mouth every 6 (six) hours as needed for severe pain Max Daily Amount: 20 mg, Disp: 40 tablet, Rfl: 0     Please note:  Voice-recognition software may have been used in the preparation of this document.  Occasional wrong word or \"sound-alike\" substitutions may have " occurred due to the inherent limitations of voice recognition software.  Interpretation should be guided by context.         MARTA Keller  2/11/2025  10:08 AM

## 2025-02-12 ENCOUNTER — NURSING HOME VISIT (OUTPATIENT)
Dept: WOUND CARE | Facility: HOSPITAL | Age: 60
End: 2025-02-12
Payer: COMMERCIAL

## 2025-02-12 DIAGNOSIS — T81.31XD POSTOPERATIVE WOUND DEHISCENCE, SUBSEQUENT ENCOUNTER: Primary | ICD-10-CM

## 2025-02-12 PROCEDURE — 99309 SBSQ NF CARE MODERATE MDM 30: CPT | Performed by: NURSE PRACTITIONER

## 2025-02-12 NOTE — PROGRESS NOTES
[facility] WOUND CARE MANAGEMENT   AND HYPERBARIC MEDICINE CENTER       Patient ID: Sofi Jain is a 59 y.o. female Date of Birth 1965     Location of Service: [facility]    Performed wound round with: Wound team     Chief Complaint : Left thigh    Wound Instructions:  Wound:  Left thigh  Discontinue previous wound order  Cleanse the wound bed with NSS   Apply non-sting skin prep to periwound area  Apply calcium alginate to wound bed, then cover with ABD pad   Frequency : Twice a day and prn for soiling  Please schedule follow-up with surgeon  Offload all wounds  Turn and reposition frequently  Instruct / Assist with weight shifting in wheelchair  Increase protein intake.  Monitor for any sign of infection or worsening, inform PCP or patient's primary physician in your facility.      Allergies  Penicillins      Assessment & Plan:  1. Postoperative wound dehiscence, subsequent encounter  Assessment & Plan:  Patient is status post left femoral bypass graft on December 18, 2024  The surgical incision on the left thigh have 3 open area, wound slightly improved compared to last week, decreasing wound size, the third open area is healed.    Local wound care with calcium alginate.  Change it to twice a day as per patient request.  The following were discussed during the visit.  Patient's friend was on the phone, unit manager, and director of nursing was present.  -Patient would like to be discharged today.  As per patient, her friend will be taking care of managing her wound while at home.  Patient was made aware that facility nurse need to train her friend for his health to change the wound dressing.  Unfortunately, patient's friend does not have a car and will not be able to drive to the facility.  Unit manager agreed to provide education to patient's friend through video call.  -Patient requested to change wound treatment to twice a day.  Patient was made aware that the facility can  accommodate the twice a day change of wound treatment.  However, we cannot guarantee that the insurance will  cover a twice a day change of calcium alginate.  Patient was aware.  -Patient was aware that she needs to follow-up with her surgeon for removal of the suture.  Please schedule follow-up with surgeon close discharge                   Subjective:   February 5, 2025.  New hospital wound on the left thigh.  Patient was referred by Senior care team.  Medical problem includes but not limited to type 2 diabetes, hypertension, and vitamin D deficiency.  I introduced myself to patient and patient agreed to be seen for wound consult.  Patient was seen with the facility wound team.    Wound history: As per medical record review, patient had femoral bypass graft for 18, 2024 at [facility].  The wound dehisced.  At one point, the wound was on wound VAC.    Received patient in bed, seems comfortable.  Denies pain.  As per patient, she wants to go home.  She does not want wound VAC.  There is no home health that will be taking care of wound VAC.    February 12, 2025.  Follow-up for wound on the left thigh.  As per report, patient would like to go home today.  Denies pain.        Review of Systems   Constitutional: Negative.    Respiratory: Negative.     Cardiovascular: Negative.    Musculoskeletal:  Positive for gait problem.   Skin:  Positive for wound.       Objective:    Physical Exam  Constitutional:       Appearance: Normal appearance.   Cardiovascular:      Rate and Rhythm: Normal rate.   Pulmonary:      Effort: Pulmonary effort is normal.   Musculoskeletal:      Right lower leg: Edema present.      Comments: Mild edema left lower leg   Skin:     Findings: Lesion present.      Comments: Left thigh: Surgical wound is sutured, 8 sutures, with 3 dehisced area  Proximal: Wound size is 4.1 x 1.4 x 0.2 cm.,  100% granulation, moderate amount of serous drainage, periwound normal, with no obvious sign of infection    medial: Wound size is 5.3 x 2.7 x 1 cm.,  100% granulation, moderate amount of serous drainage, periwound normal, with no obvious sign of infection  Distal: Wound is healed   Neurological:      Mental Status: She is alert.      Gait: Gait abnormal.              Procedures           Patient's care was coordinated with nursing facility staff. Recent vitals, labs and updated medications were reviewed on EMR or chart system of facility. Past Medical, surgical, social, medication and allergy history and patient's previous records were reviewed and updated as appropriate: Most up-to date information is available in the facility EMR where the patient is currently admitted.    Patient Active Problem List   Diagnosis    Wound dehiscence, surgical    Diabetes mellitus, type 2 (HCC)    Essential hypertension    Idiopathic chronic gout of foot without tophus    Vitamin D deficiency    Ambulatory dysfunction    Mild nasal congestion     History reviewed. No pertinent past medical history.  History reviewed. No pertinent surgical history.  Social History     Socioeconomic History    Marital status: Single     Spouse name: None    Number of children: None    Years of education: None    Highest education level: None   Occupational History    None   Tobacco Use    Smoking status: Every Day     Current packs/day: 1.50     Average packs/day: 1.5 packs/day for 39.0 years (58.5 ttl pk-yrs)     Types: Cigarettes    Smokeless tobacco: Never   Substance and Sexual Activity    Alcohol use: None    Drug use: None    Sexual activity: None   Other Topics Concern    None   Social History Narrative    None     Social Drivers of Health     Financial Resource Strain: Medium Risk (1/1/2025)    Received from [facility]    Overall Financial Resource Strain (CARDIA)     Difficulty of Paying Living Expenses: Somewhat hard   Food Insecurity: No Food Insecurity (1/1/2025)    Received from [facility]    Hunger Vital  Sign     Worried About Running Out of Food in the Last Year: Never true     Ran Out of Food in the Last Year: Never true   Transportation Needs: No Transportation Needs (1/1/2025)    Received from [facility]    PRAPARE - Transportation     Lack of Transportation (Medical): No     Lack of Transportation (Non-Medical): No   Physical Activity: Not on file   Stress: Not on file   Social Connections: Unknown (6/28/2023)    Received from [facility]    Social Connection and Isolation Panel [NHANES]     Frequency of Communication with Friends and Family: More than three times a week     Frequency of Social Gatherings with Friends and Family: More than three times a week     Attends Uatsdin Services: Never     Active Member of Clubs or Organizations: No     Attends Club or Organization Meetings: Never     Marital Status: Not on file   Intimate Partner Violence: Not At Risk (1/1/2025)    Received from [facility]    Humiliation, Afraid, Rape, and Kick questionnaire     Fear of Current or Ex-Partner: No     Emotionally Abused: No     Physically Abused: No     Sexually Abused: No   Housing Stability: Low Risk  (1/1/2025)    Received from [facility]    Housing Stability Vital Sign     Unable to Pay for Housing in the Last Year: No     Number of Times Moved in the Last Year: 0     Homeless in the Last Year: No        Current Outpatient Medications:     acetaminophen (TYLENOL) 500 mg tablet, Take 500 mg by mouth every 6 (six) hours as needed for mild pain, Disp: , Rfl:     allopurinol (ZYLOPRIM) 100 mg tablet, Take 100 mg by mouth, Disp: , Rfl:     ascorbic acid (VITAMIN C) 250 mg tablet, Take 250 mg by mouth daily, Disp: , Rfl:     Cholecalciferol (VITAMIN D) 2000 units CAPS, Take by mouth, Disp: , Rfl:     clopidogrel (PLAVIX) 75 mg tablet, Take 75 mg by mouth daily, Disp: , Rfl:     ergocalciferol (ERGOCALCIFEROL) 1.25 MG (77034 UT) capsule, Take 50,000  Units by mouth once a week, Disp: , Rfl:     famotidine (PEPCID) 20 mg tablet, Take 20 mg by mouth 2 (two) times a day, Disp: , Rfl:     fenofibrate micronized (LOFIBRA) 67 MG capsule, Take 67 mg by mouth every morning before breakfast, Disp: , Rfl:     fluticasone (FLONASE) 50 mcg/act nasal spray, 1 spray into each nostril daily, Disp: , Rfl:     guaiFENesin (ROBITUSSIN) 100 MG/5ML oral liquid, Take 10 mL (200 mg total) by mouth 3 (three) times a day as needed for cough, Disp: , Rfl:     hydrOXYzine HCL (ATARAX) 25 mg tablet, Take 25 mg by mouth every 8 (eight) hours as needed for itching, Disp: , Rfl:     insulin glargine (LANTUS) 100 units/mL subcutaneous injection, Inject 35 Units under the skin daily, Disp: , Rfl:     insulin lispro (HumALOG/ADMELOG) 100 units/mL injection, Inject 12 Units under the skin 3 (three) times a day with meals, Disp: , Rfl:     lisinopril (ZESTRIL) 5 mg tablet, Take 5 mg by mouth, Disp: , Rfl:     loratadine (CLARITIN) 10 mg tablet, Take 10 mg by mouth, Disp: , Rfl:     metFORMIN (GLUCOPHAGE) 1000 MG tablet, Take 1,000 mg by mouth, Disp: , Rfl:     Multiple Vitamin (multivitamin) tablet, Take 1 tablet by mouth daily, Disp: , Rfl:     oxyCODONE (ROXICODONE) 5 immediate release tablet, Take 1 tablet (5 mg total) by mouth every 6 (six) hours as needed for severe pain Max Daily Amount: 20 mg, Disp: 40 tablet, Rfl: 0  History reviewed. No pertinent family history.           Coordination of Care: Wound team aware of the treatment plan. Facility nurse will provide wound treatment and monitor the wound for any changes.     Patient / Staff education : Patient / Staff was given education on sign of infection and pressure ulcer prevention. Patient/ Staff verbalized understanding     Follow up :  Next week    I have spent a total time of 30 minutes in caring for this patient on the day of the visit/encounter including Risks and benefits of tx options, Instructions for management, Patient and  "family education, Importance of tx compliance, Risk factor reductions, Documenting in the medical record, and Communicating with other healthcare professionals .        Voice-recognition software may have been used in the preparation of this document. Occasional wrong word or \"sound-alike\" substitutions may have occurred due to the inherent limitations of voice recognition software. Interpretation should be guided by context.      MARTA Jara  "

## 2025-02-13 VITALS
HEART RATE: 72 BPM | BODY MASS INDEX: 36.81 KG/M2 | DIASTOLIC BLOOD PRESSURE: 85 MMHG | TEMPERATURE: 97.8 F | WEIGHT: 221.2 LBS | OXYGEN SATURATION: 96 % | SYSTOLIC BLOOD PRESSURE: 128 MMHG | RESPIRATION RATE: 18 BRPM

## 2025-02-13 RX ORDER — SENNOSIDES A AND B 8.6 MG/1
1 TABLET, FILM COATED ORAL DAILY PRN
COMMUNITY

## 2025-02-13 RX ORDER — ALOGLIPTIN 25 MG/1
25 TABLET, FILM COATED ORAL DAILY
COMMUNITY

## 2025-02-13 RX ORDER — ALBUTEROL SULFATE 90 UG/1
2 INHALANT RESPIRATORY (INHALATION) EVERY 4 HOURS PRN
COMMUNITY

## 2025-02-13 RX ORDER — NUTRITIONAL SUPPLEMENT
1 POWDER (GRAM) ORAL 2 TIMES DAILY
COMMUNITY

## 2025-02-13 NOTE — ASSESSMENT & PLAN NOTE
Lab Results   Component Value Date    HGBA1C 8.8 (H) 12/07/2024   A1c above goal  Morning   BS trending 120-246  Continue accuchecks  Humalog 12 units TID with meals  Continue Glargine 35 units in the afternoon  Continue Alogliptin 25 mg daily  Continue metformin 1000 mg BID  Encourage diabetic diet  Patient has stated she does not want to take insulin and will follow up with her PCP. Patient was educated on current regime and was advised to continue as BS has been controlled. She said she will check her bs 2 X a day and follow up with her PCP if they go high.  I explained I would send scripts for her insulin so it is available if she chooses to take it.

## 2025-02-13 NOTE — PROGRESS NOTES
[facility]  [address] [address]  [phone]  DISCHARGE SUMMARY  Facility: [facility]  Code 31    NAME: Sofi Jain  AGE: 59 y.o. SEX: female   CODE STATUS: CPR    DATE OF ADMISSION: 1/30/25   DATE OF DISCHARGE: 2/14/25   DISCHARGE DISPOSITION: Stable for discharge to home with family support and home health PT/OT/SN services.   Reason for Admission: Patient was admitted to [facility] for [facility] after hospitalization for postoperative infection.    Past Medical and Surgical History:   History reviewed. No pertinent past medical history.   History reviewed. No pertinent surgical history.    Course of stay:   HPI  Sofi Jain is a 59 year old female, she is a STR patient of [facility] since 1/30/25. Past Medical Hx including but not limited to HTN, DM, vitamin D deficiency, gout. She was seen in collaboration with nursing for medical mgmt and STR follow up.      Hospital Course  Patient was admitted to the hospital 12/31/24 d/t postoperative infection. Patient had recent left femoral post. Tibial artery bypass graft ( PTFE) on 12/18/24. Attempted harvest of left GSV but unable 2/2 to inadequate size.  Patient reported increased left groin pain, redness, and drainage from her incision site over the last several days.  Patient had gone to the ER the day before and received IV antibiotics and was discharged on doxycycline which she had not started yet.  Patient had gone to vascular office today with wound dehiscence and worsening drainage and erythema from left groin incision.  Patient was sent to the ER. Patient underwent sx debridement with vac placement. Patient was recommended for rehab and was discharged to [facility].     Rehab Course  Sofi was seen and examined at bedside today.  Patient is alert and oriented x 3.  On exam patient is sitting in her wheelchair, does not appear to be in distress.  She is asking for dressing to be changed  prior to discharge, nursing is aware. Patient said she is not planning on continuing insulin until she follows up with her PCP. She says she will check her blood sugar 2 x per day.  I recommended she continue taking insulin as her BS has been well controlled with the current regime. I will send scripts to her preferred pharmacy.      Denies CP/SOB/N/V/D. Denies lightheadedness, dizziness, headaches, vision changes. Denies any bowel or bladder issues. Per review of SNF records, Patient is eating 3 meals per day, consuming %. Last documented BM 2/12/2025. No concerns from nursing at this time.       During the patients stay at [facility], she received skilled nursing care, PT, OT, social service support, dietician support, and medical management.  Pt scheduled to be discharged on 2/14/25.  ROS:  Review of Systems   Constitutional:  Negative for activity change, appetite change and fever.   HENT:  Negative for congestion, postnasal drip and sore throat.    Respiratory:  Negative for shortness of breath and wheezing.    Cardiovascular:  Negative for chest pain, palpitations and leg swelling.   Gastrointestinal:  Negative for constipation, diarrhea, nausea and vomiting.   Genitourinary:  Negative for difficulty urinating and dysuria.   Musculoskeletal:  Positive for gait problem.   Skin:  Positive for wound.        Left groin incision   Neurological:  Negative for dizziness, weakness and headaches.   Psychiatric/Behavioral:  Negative for sleep disturbance.        PHYSICAL EXAM:  VITALS:   Vitals:    02/13/25 1701   BP: 128/85   Pulse: 72   Resp: 18   Temp: 97.8 °F (36.6 °C)   SpO2: 96%        Physical Exam  Constitutional:       Appearance: Normal appearance.   HENT:      Head: Normocephalic and atraumatic.      Mouth/Throat:      Mouth: Mucous membranes are moist.   Eyes:      Conjunctiva/sclera: Conjunctivae normal.   Cardiovascular:      Rate and Rhythm: Normal rate and regular rhythm.      Heart sounds: Normal heart  sounds.   Pulmonary:      Effort: Pulmonary effort is normal. No respiratory distress.      Breath sounds: Normal breath sounds. No wheezing.   Abdominal:      General: Bowel sounds are normal. There is no distension.      Palpations: Abdomen is soft.      Tenderness: There is no abdominal tenderness.   Musculoskeletal:      Right lower leg: No edema.      Left lower leg: No edema.   Skin:     General: Skin is warm.   Neurological:      Mental Status: She is alert and oriented to person, place, and time.      Motor: Weakness present.      Gait: Gait abnormal.   Psychiatric:         Mood and Affect: Mood normal.         Behavior: Behavior normal.         Admission Diagnoses:   1. Type 2 diabetes mellitus with other specified complication, without long-term current use of insulin ([facility])  Assessment & Plan:    Lab Results   Component Value Date    HGBA1C 8.8 (H) 12/07/2024   A1c above goal  Morning   BS trending 120-246  Continue accuchecks  Humalog 12 units TID with meals  Continue Glargine 35 units in the afternoon  Continue Alogliptin 25 mg daily  Continue metformin 1000 mg BID  Encourage diabetic diet  Patient has stated she does not want to take insulin and will follow up with her PCP. Patient was educated on current regime and was advised to continue as BS has been controlled. She said she will check her bs 2 X a day and follow up with her PCP if they go high.  I explained I would send scripts for her insulin so it is available if she chooses to take it.  Orders:  -     Insulin Glargine Solostar (Lantus SoloStar) 100 UNIT/ML SOPN; Inject 0.35 mL (35 Units total) under the skin see administration instructions Take in the afternoon  -     Insulin Pen Needle (Pen Needles) 30G X 5 MM MISC; Use 1 each 4 (four) times a day  -     Alcohol Swabs (Alcohol Pads) 70 % PADS; Use 1 each 4 (four) times a day  -     insulin lispro (HumaLOG KwikPen) 100 units/mL injection pen; Inject 12 Units under the skin 3 (three) times  a day with meals  2. Postoperative wound dehiscence, subsequent encounter  Assessment & Plan:  S/P vascular sx with wound dehiscence  Left groin wound dressing intact  Patient requesting BID changes d/t drainage  Continue local wound care  Wound NP to follow  Outpatient f/u with surgery  3. Essential hypertension  Assessment & Plan:  /85  Continue to monitor BP  Continue Lisinopril 5 mg daily  4. Idiopathic chronic gout of right foot without tophus  Assessment & Plan:  Continue Allopurinol 100 mg daily  5. Ambulatory dysfunction  Assessment & Plan:  Multifactorial  Continue PT/OT  Fall Precautions  Ensure adequate nutrition/hydration   Monitor CBC/BMP    following for d/c planning    6. Vitamin D deficiency  Assessment & Plan:  Continue Vitamin D 50,000 through 4/4/25  7. Pain  -     oxyCODONE (ROXICODONE) 5 immediate release tablet; Take 1 tablet (5 mg total) by mouth every 6 (six) hours as needed for severe pain Max Daily Amount: 20 mg       Follow-up Recommendations:    Outpatient Follow up with PCP in the next 2 weeks  Home Health PT/OT/SN services     Labs and testing performed during stay:        Discharge Medications: See discharge medication list which was reviewed and signed.      Current Outpatient Medications:     acetaminophen (TYLENOL) 500 mg tablet, Take 500 mg by mouth every 6 (six) hours as needed for mild pain, Disp: , Rfl:     albuterol (Ventolin HFA) 90 mcg/act inhaler, Inhale 2 puffs every 4 (four) hours as needed for wheezing, Disp: , Rfl:     Alcohol Swabs (Alcohol Pads) 70 % PADS, Use 1 each 4 (four) times a day, Disp: 100 each, Rfl: 3    allopurinol (ZYLOPRIM) 100 mg tablet, Take 100 mg by mouth, Disp: , Rfl:     Alogliptin Benzoate 25 MG TABS, Take 25 mg by mouth daily, Disp: , Rfl:     ascorbic acid (VITAMIN C) 250 mg tablet, Take 250 mg by mouth daily, Disp: , Rfl:     clopidogrel (PLAVIX) 75 mg tablet, Take 75 mg by mouth daily, Disp: , Rfl:     ergocalciferol  (ERGOCALCIFEROL) 1.25 MG (40032 UT) capsule, Take 50,000 Units by mouth once a week, Disp: , Rfl:     famotidine (PEPCID) 20 mg tablet, Take 20 mg by mouth 2 (two) times a day, Disp: , Rfl:     fenofibrate micronized (LOFIBRA) 67 MG capsule, Take 67 mg by mouth every morning before breakfast, Disp: , Rfl:     fluticasone (FLONASE) 50 mcg/act nasal spray, 1 spray into each nostril daily, Disp: , Rfl:     guaiFENesin (ROBITUSSIN) 100 MG/5ML oral liquid, Take 10 mL (200 mg total) by mouth 3 (three) times a day as needed for cough, Disp: , Rfl:     hydrOXYzine HCL (ATARAX) 25 mg tablet, Take 25 mg by mouth every 8 (eight) hours as needed for itching, Disp: , Rfl:     Insulin Glargine Solostar (Lantus SoloStar) 100 UNIT/ML SOPN, Inject 0.35 mL (35 Units total) under the skin see administration instructions Take in the afternoon, Disp: 3 mL, Rfl: 3    insulin lispro (HumaLOG KwikPen) 100 units/mL injection pen, Inject 12 Units under the skin 3 (three) times a day with meals, Disp: 3 mL, Rfl: 3    Insulin Pen Needle (Pen Needles) 30G X 5 MM MISC, Use 1 each 4 (four) times a day, Disp: 100 each, Rfl: 3    lisinopril (ZESTRIL) 5 mg tablet, Take 5 mg by mouth, Disp: , Rfl:     loratadine (CLARITIN) 10 mg tablet, Take 10 mg by mouth, Disp: , Rfl:     metFORMIN (GLUCOPHAGE) 1000 MG tablet, Take 1,000 mg by mouth, Disp: , Rfl:     Multiple Vitamin (multivitamin) tablet, Take 1 tablet by mouth daily, Disp: , Rfl:     Nutritional Supplements (Mayito) POWD, Take 1 packet by mouth 2 (two) times a day Wound healing, Disp: , Rfl:     Nutritional Supplements ([employer]) LIQD, Take 30 mL by mouth 2 (two) times a day Wound healing, Disp: , Rfl:     oxyCODONE (ROXICODONE) 5 immediate release tablet, Take 1 tablet (5 mg total) by mouth every 6 (six) hours as needed for severe pain Max Daily Amount: 20 mg, Disp: 40 tablet, Rfl: 0    senna (SENOKOT) 8.6 MG tablet, Take 1 tablet by mouth daily as needed for constipation, Disp: , Rfl:   "    Discussion with patient/family and further instructions:  -Fall precautions  -Aspiration precautions  -Bleeding precautions  -Monitor for signs/symptoms of infection  -Medication list was reviewed and signed  -DME form was completed    Status at time of discharge: Stable     Billing based on time. Time spent on unit, 40 minutes. Time spent counseling pt on debility/condition, 30 minutes.    Please note:  Voice-recognition software may have been used in the preparation of this document.  Occasional wrong word or \"sound-alike\" substitutions may have occurred due to the inherent limitations of voice recognition software.  Interpretation should be guided by context.        MARTA Keller  2/14/2025   "

## 2025-02-13 NOTE — ASSESSMENT & PLAN NOTE
Patient is status post left femoral bypass graft on December 18, 2024  The surgical incision on the left thigh have 3 open area, wound slightly improved compared to last week, decreasing wound size, the third open area is healed.    Local wound care with calcium alginate.  Change it to twice a day as per patient request.  The following were discussed during the visit.  Patient's friend was on the phone, unit manager, and director of nursing was present.  -Patient would like to be discharged today.  As per patient, her friend will be taking care of managing her wound while at home.  Patient was made aware that facility nurse need to train her friend for his health to change the wound dressing.  Unfortunately, patient's friend does not have a car and will not be able to drive to the facility.  Unit manager agreed to provide education to patient's friend through video call.  -Patient requested to change wound treatment to twice a day.  Patient was made aware that the facility can accommodate the twice a day change of wound treatment.  However, we cannot guarantee that the insurance will  cover a twice a day change of calcium alginate.  Patient was aware.  -Patient was aware that she needs to follow-up with her surgeon for removal of the suture.  Please schedule follow-up with surgeon close discharge

## 2025-02-13 NOTE — ASSESSMENT & PLAN NOTE
S/P vascular sx with wound dehiscence  Left groin wound dressing intact  Patient requesting BID changes d/t drainage  Continue local wound care  Wound NP to follow  Outpatient f/u with surgery

## 2025-02-14 ENCOUNTER — NURSING HOME VISIT (OUTPATIENT)
Dept: GERIATRICS | Facility: OTHER | Age: 60
End: 2025-02-14
Payer: COMMERCIAL

## 2025-02-14 DIAGNOSIS — R26.2 AMBULATORY DYSFUNCTION: ICD-10-CM

## 2025-02-14 DIAGNOSIS — M1A.0710 IDIOPATHIC CHRONIC GOUT OF RIGHT FOOT WITHOUT TOPHUS: ICD-10-CM

## 2025-02-14 DIAGNOSIS — R52 PAIN: ICD-10-CM

## 2025-02-14 DIAGNOSIS — T81.31XD POSTOPERATIVE WOUND DEHISCENCE, SUBSEQUENT ENCOUNTER: ICD-10-CM

## 2025-02-14 DIAGNOSIS — I10 ESSENTIAL HYPERTENSION: ICD-10-CM

## 2025-02-14 DIAGNOSIS — E55.9 VITAMIN D DEFICIENCY: ICD-10-CM

## 2025-02-14 DIAGNOSIS — E11.69 TYPE 2 DIABETES MELLITUS WITH OTHER SPECIFIED COMPLICATION, WITHOUT LONG-TERM CURRENT USE OF INSULIN (HCC): Primary | ICD-10-CM

## 2025-02-14 PROCEDURE — 99316 NF DSCHRG MGMT 30 MIN+: CPT

## 2025-02-14 RX ORDER — PEN NEEDLE, DIABETIC 30 GX3/16"
1 NEEDLE, DISPOSABLE MISCELLANEOUS 4 TIMES DAILY
COMMUNITY
End: 2025-02-14 | Stop reason: SDUPTHER

## 2025-02-14 RX ORDER — INSULIN GLARGINE 100 [IU]/ML
35 INJECTION, SOLUTION SUBCUTANEOUS SEE ADMIN INSTRUCTIONS
Qty: 3 ML | Refills: 3 | Status: SHIPPED | OUTPATIENT
Start: 2025-02-14

## 2025-02-14 RX ORDER — BLOOD SUGAR DIAGNOSTIC
1 STRIP MISCELLANEOUS 4 TIMES DAILY
COMMUNITY
End: 2025-02-14 | Stop reason: SDUPTHER

## 2025-02-14 RX ORDER — INSULIN LISPRO 100 [IU]/ML
12 INJECTION, SOLUTION INTRAVENOUS; SUBCUTANEOUS
Qty: 3 ML | Refills: 3 | Status: SHIPPED | OUTPATIENT
Start: 2025-02-14

## 2025-02-14 RX ORDER — INSULIN GLARGINE 100 [IU]/ML
35 INJECTION, SOLUTION SUBCUTANEOUS DAILY
COMMUNITY
End: 2025-02-14 | Stop reason: SDUPTHER

## 2025-02-14 RX ORDER — BLOOD SUGAR DIAGNOSTIC
1 STRIP MISCELLANEOUS 4 TIMES DAILY
Qty: 100 EACH | Refills: 3 | Status: SHIPPED | OUTPATIENT
Start: 2025-02-14

## 2025-02-14 RX ORDER — PEN NEEDLE, DIABETIC 30 GX3/16"
1 NEEDLE, DISPOSABLE MISCELLANEOUS 4 TIMES DAILY
Qty: 100 EACH | Refills: 3 | Status: SHIPPED | OUTPATIENT
Start: 2025-02-14

## 2025-02-14 RX ORDER — OXYCODONE HYDROCHLORIDE 5 MG/1
5 TABLET ORAL EVERY 6 HOURS PRN
Qty: 40 TABLET | Refills: 0 | Status: SHIPPED | OUTPATIENT
Start: 2025-02-14

## 2025-02-14 RX ORDER — INSULIN LISPRO 100 [IU]/ML
12 INJECTION, SOLUTION INTRAVENOUS; SUBCUTANEOUS
COMMUNITY
End: 2025-02-14 | Stop reason: SDUPTHER

## 2025-06-09 DIAGNOSIS — E11.69 TYPE 2 DIABETES MELLITUS WITH OTHER SPECIFIED COMPLICATION, WITHOUT LONG-TERM CURRENT USE OF INSULIN (HCC): ICD-10-CM

## 2025-06-09 RX ORDER — INSULIN LISPRO 100 [IU]/ML
12 INJECTION, SOLUTION INTRAVENOUS; SUBCUTANEOUS
Qty: 9 ML | Refills: 2 | OUTPATIENT
Start: 2025-06-09